# Patient Record
Sex: MALE | Race: WHITE | Employment: FULL TIME | URBAN - METROPOLITAN AREA
[De-identification: names, ages, dates, MRNs, and addresses within clinical notes are randomized per-mention and may not be internally consistent; named-entity substitution may affect disease eponyms.]

---

## 2017-05-05 ENCOUNTER — EMPLOYEE WELLNESS (OUTPATIENT)
Dept: OTHER | Age: 47
End: 2017-05-05

## 2017-05-05 LAB
CHOLESTEROL, TOTAL: 188 MG/DL (ref 0–199)
GLUCOSE BLD-MCNC: 88 MG/DL (ref 74–109)
HDLC SERPL-MCNC: 51 MG/DL (ref 40–59)
LDL CHOLESTEROL CALCULATED: 126 MG/DL (ref 0–129)
TRIGL SERPL-MCNC: 54 MG/DL (ref 0–200)

## 2018-03-20 VITALS — BODY MASS INDEX: 28.5 KG/M2 | WEIGHT: 193 LBS

## 2018-06-22 LAB
CHOLESTEROL, TOTAL: 203 MG/DL (ref 0–199)
GLUCOSE BLD-MCNC: 99 MG/DL (ref 74–109)
HDLC SERPL-MCNC: 52 MG/DL (ref 40–59)
LDL CHOLESTEROL CALCULATED: 130 MG/DL (ref 0–129)
TRIGL SERPL-MCNC: 103 MG/DL (ref 0–200)

## 2021-01-29 ENCOUNTER — NURSE TRIAGE (OUTPATIENT)
Dept: OTHER | Facility: CLINIC | Age: 51
End: 2021-01-29

## 2021-01-29 ENCOUNTER — VIRTUAL VISIT (OUTPATIENT)
Dept: FAMILY MEDICINE CLINIC | Age: 51
End: 2021-01-29
Payer: COMMERCIAL

## 2021-01-29 DIAGNOSIS — Z20.822 ENCOUNTER BY TELEHEALTH FOR SUSPECTED COVID-19: Primary | ICD-10-CM

## 2021-01-29 DIAGNOSIS — Z20.822 ENCOUNTER BY TELEHEALTH FOR SUSPECTED COVID-19: ICD-10-CM

## 2021-01-29 DIAGNOSIS — R43.0 LOSS OF SMELL: ICD-10-CM

## 2021-01-29 PROCEDURE — 99442 PR PHYS/QHP TELEPHONE EVALUATION 11-20 MIN: CPT | Performed by: NURSE PRACTITIONER

## 2021-01-29 ASSESSMENT — ENCOUNTER SYMPTOMS
NAUSEA: 0
DIARRHEA: 0
SORE THROAT: 0
COUGH: 1
RHINORRHEA: 0
SHORTNESS OF BREATH: 0
VOMITING: 0

## 2021-01-29 NOTE — TELEPHONE ENCOUNTER
[facility]  with red flag complaint. Brief description of triage: loss of smell and taste, cough, joint pain. Triage indicates for patient to Discuss with pcp and call back by nurse today     Care advice provided, patient verbalizes understanding; denies any other questions or concerns; instructed to call back for any new or worsening symptoms. Writer provided warm transfer to Laurie Christianson at [facility] for appointment scheduling. Attention Provider: Thank you for allowing me to participate in the care of your patient. The patient was connected to triage in response to information provided to the [facility]. Please do not respond through this encounter as the response is not directed to a shared pool. Reason for Disposition   [1] COVID-19 infection suspected by caller or triager AND [2] mild symptoms (cough, fever, or others) AND [1] no complications or SOB    Answer Assessment - Initial Assessment Questions  1. COVID-19 DIAGNOSIS: \"Who made your Coronavirus (COVID-19) diagnosis? \" \"Was it confirmed by a positive lab test?\" If not diagnosed by a HCP, ask \"Are there lots of cases (community spread) where you live? \" (See public health department website, if unsure)      Denies     2. COVID-19 EXPOSURE: \"Was there any known exposure to COVID before the symptoms began? \" CDC Definition of close contact: within 6 feet (2 meters) for a total of 15 minutes or more over a 24-hour period. Denies     3. ONSET: \"When did the COVID-19 symptoms start?\"       01/29/2021    4. WORST SYMPTOM: \"What is your worst symptom? \" (e.g., cough, fever, shortness of breath, muscle aches)      Loss of smell and taste     5. COUGH: \"Do you have a cough? \" If so, ask: \"How bad is the cough? \"        Yes, dry cough     6. FEVER: \"Do you have a fever? \" If so, ask: \"What is your temperature, how was it measured, and when did it start? \"      Denies     7.  RESPIRATORY STATUS: \"Describe your breathing? \" (e.g., shortness of breath, wheezing, unable to speak)       Denies     8. BETTER-SAME-WORSE: [place] you getting better, staying the same or getting worse compared to yesterday? \"  If getting worse, ask, \"In what way? \"      Worse, loss of smell and taste     9. HIGH RISK DISEASE: \"Do you have any chronic medical problems? \" (e.g., asthma, heart or lung disease, weak immune system, etc.)      Denies     10. PREGNANCY: \"Is there any chance you are pregnant? \" \"When was your last menstrual period? \"        N/a     11. OTHER SYMPTOMS: \"Do you have any other symptoms? \"  (e.g., chills, fatigue, headache, loss of smell or taste, muscle pain, sore throat)        See travel screen    Protocols used: CORONAVIRUS (COVID-19) DIAGNOSED OR SUSPECTED-ADULT-[place]

## 2021-01-29 NOTE — PROGRESS NOTES
level: Not on file   Occupational History    Not on file   Social Needs    Financial resource strain: Not on file    Food insecurity     Worry: Not on file     Inability: Not on file    Transportation needs     Medical: Not on file     Non-medical: Not on file   Tobacco Use    Smoking status: Never Smoker    Smokeless tobacco: Never Used   Substance and Sexual Activity    Alcohol use: Yes     Alcohol/week: 7.0 standard drinks     Types: 7 Shots of liquor per week     Comment: kee law - 1 shot every night for 10 years    Drug use: Yes     Types: Marijuana     Comment: once a day     Sexual activity: Yes     Comment: wife   Lifestyle    Physical activity     Days per week: Not on file     Minutes per session: Not on file    Stress: Not on file   Relationships    Social connections     Talks on phone: Not on file     Gets together: Not on file     Attends Restorationist service: Not on file     Active member of club or organization: Not on file     Attends meetings of clubs or organizations: Not on file     Relationship status: Not on file    Intimate partner violence     Fear of current or ex partner: Not on file     Emotionally abused: Not on file     Physically abused: Not on file     Forced sexual activity: Not on file   Other Topics Concern    Not on file   Social History Narrative    Not on file     Family History   Problem Relation Age of Onset    Heart Disease Father     Cancer Mother      Allergies:  Patient has no known allergies. Review of Systems   Constitutional: Negative for chills, diaphoresis, fatigue and fever. HENT: Positive for congestion (nasal). Negative for rhinorrhea and sore throat. Loss of smell  Diminished taste   Respiratory: Positive for cough (dry (started months ago)). Negative for shortness of breath. Gastrointestinal: Negative for diarrhea, nausea and vomiting. Musculoskeletal: Negative for myalgias. Neurological: Negative for headaches. PHYSICAL EXAM/ RESULTS    (Limited exam: only performed what is available through a visual exam during the video encounter as indicated due to the restrictions of the COVID-19 pandemic)    There were no vitals taken for this visit. Physical Exam  HENT:      Nose: Congestion present. Pulmonary:      Effort: Pulmonary effort is normal. No respiratory distress. Neurological:      Mental Status: He is alert. Psychiatric:         Behavior: Behavior normal.         Thought Content: Thought content normal.         Judgment: Judgment normal.         No results found for this or any previous visit (from the past 2016 hour(s)). Assessment:       Diagnosis Orders   1. Encounter by telehealth for suspected COVID-19  COVID-19 Ambulatory   2. Loss of smell           Orders Placed This Encounter   Procedures    COVID-19 Ambulatory     Standing Status:   Future     Standing Expiration Date:   1/29/2022     Scheduling Instructions:      Saline media preferred given current shortage of viral transport media but both acceptable     Order Specific Question:   Is this test for diagnosis or screening? Answer:   Diagnosis of ill patient     Order Specific Question:   Symptomatic for COVID-19 as defined by CDC? Answer:   Yes     Order Specific Question:   Date of Symptom Onset     Answer:   1/28/2021     Order Specific Question:   Hospitalized for COVID-19? Answer:   No     Order Specific Question:   Admitted to ICU for COVID-19? Answer:   No     Order Specific Question:   Employed in healthcare setting? Answer:   No     Order Specific Question:   Resident in a congregate (group) care setting? Answer:   No     Order Specific Question:   Pregnant: Answer:   No     Order Specific Question:   Previously tested for COVID-19? Answer:   No         Plan:   No follow-ups on file. There are no Patient Instructions on file for this visit.     Side effects and adverse effects of any medication prescribed today, as well as treatment plan/rationale, follow-up care, and result expectations have been discussed with the patient. Expresses understanding and desires to proceed with treatment plan. Discussed signs and symptoms which require immediate follow-up in ED/call to 911. Understanding verbalized. I have reviewed and updated the electronic medical record. As always, if symptoms worsen, go directly to ED. This visit ended at 11:30    The resources used for this visit were Smart Checkout for chart access and telephone    MAURI Farmer CNP      An  electronic signature was used to authenticate this note. --MAURI Ag CNP on 1/29/2021 at 11:28 AM        Pursuant to the emergency declaration under the [address] [place] [address] authority and the [employer] Act, this Virtual  Visit was conducted, with patient's consent, to reduce the patient's risk of exposure to COVID-19 and provide continuity of care for an established patient. Services were provided through a video synchronous discussion virtually to substitute for in-person clinic visit.

## 2021-01-29 NOTE — PATIENT INSTRUCTIONS
Patient Education        10 Things to Do When You Have COVID-19    Stay home. Don't go to school, work, or public areas. And don't use public transportation, ride-shares, or taxis unless you have no choice. Leave your home only if you need to get medical care. But call the doctor's office first so they know you're coming. And wear a cloth face cover. Ask before leaving isolation. Talk with your doctor or other health professional about when it will be safe for you to leave isolation. Wear a cloth face cover when you are around other people. It can help stop the spread of the virus when you cough or sneeze. Limit contact with people in your home. If possible, stay in a separate bedroom and use a separate bathroom. Avoid contact with pets and other animals. If possible, have a friend or family member care for them while you're sick. Cover your mouth and nose with a tissue when you cough or sneeze. Then throw the tissue in the trash right away. Wash your hands often, especially after you cough or sneeze. Use soap and water, and scrub for at least 20 seconds. If soap and water aren't available, use an alcohol-based hand . Don't share personal household items. These include bedding, towels, cups and glasses, and eating utensils. Clean and disinfect your home every day. Use household  or disinfectant wipes or sprays. Take special care to clean things that you grab with your hands. These include doorknobs, remote controls, phones, and handles on your refrigerator and microwave. And don't forget countertops, tabletops, bathrooms, and computer keyboards. Take acetaminophen (Tylenol) to relieve fever and body aches. Read and follow all instructions on the label. Current as of: December 18, 2020               Content Version: 12.7  © 2006-2021 Healthwise, [employer]. Care instructions adapted under license by [employer].  If you have questions about a medical condition or this instruction, always ask your healthcare professional. [place] [address] any warranty or liability for your use of this information.

## 2021-01-31 LAB
SARS-COV-2: DETECTED
SOURCE: ABNORMAL

## 2022-10-21 ENCOUNTER — NURSE TRIAGE (OUTPATIENT)
Dept: OTHER | Facility: CLINIC | Age: 52
End: 2022-10-21

## 2022-10-21 ENCOUNTER — HOSPITAL ENCOUNTER (EMERGENCY)
Age: 52
Discharge: HOME | End: 2022-10-22
Payer: COMMERCIAL

## 2022-10-21 VITALS
DIASTOLIC BLOOD PRESSURE: 99 MMHG | OXYGEN SATURATION: 96 % | BODY MASS INDEX: 32.49 KG/M2 | HEART RATE: 86 BPM | SYSTOLIC BLOOD PRESSURE: 183 MMHG | TEMPERATURE: 98 F | WEIGHT: 220 LBS | RESPIRATION RATE: 18 BRPM

## 2022-10-21 DIAGNOSIS — S05.02XA ABRASION OF LEFT CORNEA, INITIAL ENCOUNTER: Primary | ICD-10-CM

## 2022-10-21 PROCEDURE — 99283 EMERGENCY DEPT VISIT LOW MDM: CPT

## 2022-10-21 PROCEDURE — 6370000000 HC RX 637 (ALT 250 FOR IP): Performed by: PHYSICIAN ASSISTANT

## 2022-10-21 RX ORDER — HYDROCODONE BITARTRATE AND ACETAMINOPHEN 5; 325 MG/1; MG/1
1 TABLET ORAL ONCE
Status: COMPLETED | OUTPATIENT
Start: 2022-10-21 | End: 2022-10-21

## 2022-10-21 RX ORDER — HYDROCODONE BITARTRATE AND ACETAMINOPHEN 5; 325 MG/1; MG/1
1 TABLET ORAL EVERY 6 HOURS PRN
Qty: 10 TABLET | Refills: 0 | Status: SHIPPED | OUTPATIENT
Start: 2022-10-21 | End: 2022-10-24

## 2022-10-21 RX ADMIN — TOBRAMYCIN 0.5 INCH: 3 OINTMENT OPHTHALMIC at 23:39

## 2022-10-21 RX ADMIN — FLUORESCEIN SODIUM 1 MG: 1 STRIP OPHTHALMIC at 23:00

## 2022-10-21 RX ADMIN — HYDROCODONE BITARTRATE AND ACETAMINOPHEN 1 TABLET: 5; 325 TABLET ORAL at 23:38

## 2022-10-21 ASSESSMENT — ENCOUNTER SYMPTOMS
APNEA: 0
VOICE CHANGE: 0
EYE REDNESS: 1
COUGH: 0
VOMITING: 0
EYE PAIN: 1
NAUSEA: 1
EYE DISCHARGE: 1
ABDOMINAL DISTENTION: 0
ANAL BLEEDING: 0

## 2022-10-22 NOTE — TELEPHONE ENCOUNTER
Location of patient: [place]     Subjective: Caller states \" poked himself in the eye with a tree branch\"      Current Symptoms:   +states he is able to still see - denies blurred vision   +swelling   +left eye   +vomiting x 1 episode   +headache   -denies neck pain   +chills   +pt states he is vomiting from the pain     Onset: 8 hours ago; worsening    Associated Symptoms: NA    Pain Severity: 8/10; N/A; constant    Temperature: Denies      What has been tried: N/A     Recommended disposition: Go to ED Now    Care advice provided, patient verbalizes understanding; denies any other questions or concerns; instructed to call back for any new or worsening symptoms. Patient/caller agrees to proceed to [facility] EMERGENCY [facility] AT [place]  Emergency Department    Attention Provider: Thank you for allowing me to participate in the care of your patient. The patient was connected to triage in response to symptoms provided. Please do not respond through this encounter as the response is not directed to a shared pool.     Reason for Disposition   [1] SEVERE pain AND [2] not improved 2 hours after pain medicine/ice packs    Protocols used: Eye Injury-ADULT-

## 2022-10-22 NOTE — DISCHARGE INSTRUCTIONS
Follow up with opthamology and occupational medicine. Return to er if any symptoms worsen or new symptoms develop. do not drive or operate equipment while taking pain medications as norco can cause drowsiness

## 2022-10-22 NOTE — ED NOTES
Vision equal bilaterally without correction using snellen eye chart     Gwen Baldwin RN  10/21/22 [id number]

## 2022-10-22 NOTE — ED PROVIDER NOTES
[address] [facility] ED  eMERGENCY dEPARTMENT eNCOUnter      Pt Name: Yun Camarillo  MRN: [id number]  [place] 1970  Date of evaluation: 10/21/2022  Provider: Robert Harrell Dr       Chief Complaint   Patient presents with    Eye Injury     Poked left eye with tree branch         HISTORY OF PRESENT ILLNESS   (Location/Symptom, Timing/Onset,Context/Setting, Quality, Duration, Modifying Factors, Severity)  Note limiting factors. Yun Camarillo is a 47 y.o. male who presents to the emergency department climbing down from an excavator and branch hit him in the left eye he denies any loss of vision but notes pain especially when opening eye happened approximately 230 today denies any fever chills vomiting he did have some nauseousness secondary to pain earlier which has resolved. HPI    NursingNotes were reviewed. REVIEW OF SYSTEMS    (2-9 systems for level 4, 10 or more for level 5)     Review of Systems   Constitutional:  Negative for activity change, appetite change and unexpected weight change. HENT:  Negative for ear discharge, nosebleeds and voice change. Eyes:  Positive for pain, discharge and redness. Negative for visual disturbance. Respiratory:  Negative for apnea and cough. Cardiovascular:  Negative for chest pain. Gastrointestinal:  Positive for nausea. Negative for abdominal distention, anal bleeding and vomiting. Genitourinary:  Negative for dysuria and hematuria. Musculoskeletal:  Negative for joint swelling. Skin:  Negative for pallor. Neurological:  Negative for seizures and facial asymmetry. Hematological:  Does not bruise/bleed easily. Psychiatric/Behavioral:  Negative for behavioral problems, self-injury and sleep disturbance. All other systems reviewed and are negative. Except as noted above the remainder of the review of systems was reviewed and negative.        PAST MEDICAL HISTORY     Past Medical History:   Diagnosis Date Kidney stones     kidney stones         SURGICALHISTORY       Past Surgical History:   Procedure Laterality Date    CYST REMOVAL  1995    on spine    CYSTOSCOPY  08/19/15    FLEX W/STENT EXTRACT         CURRENT MEDICATIONS       Previous Medications    MUPIROCIN (BACTROBAN) 2 % OINTMENT    Apply topically 2 times daily. TERBINAFINE (LAMISIL) 1 % CREAM    Apply topically 2 times daily. Patient has no known allergies. FAMILY HISTORY       Family History   Problem Relation Age of Onset    Heart Disease Father     Cancer Mother           SOCIAL HISTORY       Social History     Socioeconomic History    Marital status:    Tobacco Use    Smoking status: Never    Smokeless tobacco: Never   Substance and Sexual Activity    Alcohol use: Yes     Alcohol/week: 7.0 standard drinks     Types: 7 Shots of liquor per week     Comment: kee law - 1 shot every night for 10 years    Drug use: Yes     Types: Marijuana (Weed)     Comment: once a day     Sexual activity: Yes     Comment: wife       SCREENINGS   [place] Coma Scale  Eye Opening: Spontaneous  Best Verbal Response: Oriented  Best Motor Response: Obeys commands  Priti Coma Scale Score: 15  Ebola Virus Disease (EVD) Screening   Temp: 98 °F (36.7 °C)  CIWA Assessment  BP: (!) 183/99  Heart Rate: 86    PHYSICAL EXAM    (up to 7 for level 4, 8 or more for level 5)     ED Triage Vitals [10/21/22 2233]   BP Temp Temp src Heart Rate Resp SpO2 Height Weight   (!) 183/99 98 °F (36.7 °C) -- 86 18 96 % -- 220 lb (99.8 kg)       Physical Exam  Vitals and nursing note reviewed. Constitutional:       General: He is not in acute distress. Appearance: He is well-developed. HENT:      Head: Normocephalic and atraumatic. Right Ear: External ear normal.      Left Ear: External ear normal.      Nose: Nose normal.      Mouth/Throat:      Mouth: Mucous membranes are moist.   Eyes:      General:         Right eye: No discharge.          Left eye: Discharge present. Pupils: Pupils are equal, round, and reactive to light. Comments: Ophthalmoscope negative steamy cornea negative visible foreign body patient retains normal extraocular motions. Tetracaine 2 drops instilled fluorescein stain dye uptake noted at 2 o'clock position overlying iris. Negative foreign body lids everted lids swept negative foreign body. Cardiovascular:      Rate and Rhythm: Normal rate and regular rhythm. Pulses: Normal pulses. Heart sounds: Normal heart sounds. Pulmonary:      Effort: Pulmonary effort is normal. No respiratory distress. Breath sounds: Normal breath sounds. No stridor. Abdominal:      General: Bowel sounds are normal. There is no distension. Palpations: Abdomen is soft. Musculoskeletal:         General: Normal range of motion. Cervical back: Normal range of motion and neck supple. Skin:     General: Skin is warm. Findings: No erythema. Neurological:      Mental Status: He is alert and oriented to person, place, and time. Psychiatric:         Mood and Affect: Mood normal.       RESULTS     EKG: All EKG's are interpreted by the Emergency Department Physician who either signs or Co-signsthis chart in the absence of a cardiologist.         RADIOLOGY:   Tai Marten such as CT, Ultrasound and MRI are read by the radiologist. Plain radiographic images are visualized and preliminarily interpreted by the emergency physician with the below findings:         Interpretation per the Radiologist below, if available at the time ofthis note:    No orders to display         ED BEDSIDE ULTRASOUND:   Performed by ED Physician - none    LABS:  Labs Reviewed - No data to display    All other labs were within normal range or not returned as of this dictation.     EMERGENCY DEPARTMENT COURSE and DIFFERENTIAL DIAGNOSIS/MDM:   Vitals:    Vitals:    10/21/22 2233   BP: (!) 183/99   Pulse: 86   Resp: 18   Temp: 98 °F (36.7 °C)   SpO2: 96% Weight: 220 lb (99.8 kg)            MDM  Number of Diagnoses or Management Options  Abrasion of left cornea, initial encounter  Diagnosis management comments: Patient to follow-up with ophthalmology and occupational medicine is not to drive or operate commercial equipment with eye injury or taking pain medications. Return to if any symptoms worsen or new symptoms well. We discussed ibuprofen Norco and tobramycin 3 times a day. CRITICAL CARE TIME   Total Critical Care time was   minutes, excluding separately reportableprocedures. There was a high probability of clinicallysignificant/life threatening deterioration in the patient's condition which required my urgent intervention. CONSULTS:  None    PROCEDURES:  Unless otherwise noted below, none     Procedures    FINAL IMPRESSION      1. Abrasion of left cornea, initial encounter          DISPOSITION/PLAN   DISPOSITION Decision To Discharge 10/21/2022 11:28:23 PM      PATIENT REFERRED TO:  [address] [place] [address] Rd  [phone]  Call in 1 day      [facility] opthamology    Call in 1 day      [facility]) ED  [address] [address]  [phone]  Go to   If symptoms worsen    Occupational medicine    Go in 2 days      DISCHARGE MEDICATIONS:  New Prescriptions    HYDROCODONE-ACETAMINOPHEN (NORCO) 5-325 MG PER TABLET    Take 1 tablet by mouth every 6 hours as needed for Pain for up to 3 days. Intended supply: 3 days.  Take lowest dose possible to manage pain          (Please note that portions of this note were completed with a voice recognition program.  Efforts were made to edit the dictations but occasionally words are mis-transcribed.)    Magdalene Joe PA-C (electronically signed)  Attending Emergency Physician        Magdalene Joe PA-C  10/21/22 6055

## 2024-10-08 ENCOUNTER — HOSPITAL ENCOUNTER (INPATIENT)
Age: 54
LOS: 1 days | Discharge: HOME | DRG: 881 | End: 2024-10-10
Attending: STUDENT IN AN ORGANIZED HEALTH CARE EDUCATION/TRAINING PROGRAM | Admitting: PSYCHIATRY & NEUROLOGY
Payer: COMMERCIAL

## 2024-10-08 DIAGNOSIS — F32.A DEPRESSION, UNSPECIFIED DEPRESSION TYPE: Primary | ICD-10-CM

## 2024-10-08 LAB
ALBUMIN SERPL-MCNC: 4.6 G/DL (ref 3.5–4.6)
ALP SERPL-CCNC: 48 U/L (ref 35–104)
ALT SERPL-CCNC: 28 U/L (ref 0–41)
AMPHET UR QL SCN: ABNORMAL
ANION GAP SERPL CALCULATED.3IONS-SCNC: 11 MEQ/L (ref 9–15)
APAP SERPL-MCNC: <5 UG/ML (ref 10–30)
AST SERPL-CCNC: 20 U/L (ref 0–40)
BARBITURATES UR QL SCN: ABNORMAL
BASOPHILS # BLD: 0 K/UL (ref 0–0.2)
BASOPHILS NFR BLD: 0.4 %
BENZODIAZ UR QL SCN: ABNORMAL
BILIRUB SERPL-MCNC: 0.3 MG/DL (ref 0.2–0.7)
BILIRUB UR QL STRIP: NEGATIVE
BUN SERPL-MCNC: 15 MG/DL (ref 6–20)
CALCIUM SERPL-MCNC: 9.5 MG/DL (ref 8.5–9.9)
CANNABINOIDS UR QL SCN: POSITIVE
CHLORIDE SERPL-SCNC: 102 MEQ/L (ref 95–107)
CHOLEST SERPL-MCNC: 173 MG/DL (ref 0–199)
CK SERPL-CCNC: 196 U/L (ref 0–190)
CLARITY UR: CLEAR
CO2 SERPL-SCNC: 24 MEQ/L (ref 20–31)
COCAINE UR QL SCN: ABNORMAL
COLOR UR: YELLOW
CREAT SERPL-MCNC: 0.78 MG/DL (ref 0.7–1.2)
DRUG SCREEN COMMENT UR-IMP: ABNORMAL
EOSINOPHIL # BLD: 0.1 K/UL (ref 0–0.7)
EOSINOPHIL NFR BLD: 1.3 %
ERYTHROCYTE [DISTWIDTH] IN BLOOD BY AUTOMATED COUNT: 12.9 % (ref 11.5–14.5)
ETHANOL PERCENT: NORMAL G/DL
ETHANOLAMINE SERPL-MCNC: <10 MG/DL (ref 0–0.08)
FENTANYL SCREEN, URINE: ABNORMAL
GLOBULIN SER CALC-MCNC: 2.3 G/DL (ref 2.3–3.5)
GLUCOSE SERPL-MCNC: 128 MG/DL (ref 70–99)
GLUCOSE UR STRIP-MCNC: NEGATIVE MG/DL
HCG UR QL: NEGATIVE
HCT VFR BLD AUTO: 45.1 % (ref 42–52)
HDLC SERPL-MCNC: 53 MG/DL (ref 40–59)
HGB BLD-MCNC: 15.5 G/DL (ref 14–18)
HGB UR QL STRIP: NEGATIVE
KETONES UR STRIP-MCNC: ABNORMAL MG/DL
LDLC SERPL CALC-MCNC: 99 MG/DL (ref 0–129)
LEUKOCYTE ESTERASE UR QL STRIP: NEGATIVE
LYMPHOCYTES # BLD: 1.5 K/UL (ref 1–4.8)
LYMPHOCYTES NFR BLD: 18.7 %
MCH RBC QN AUTO: 29.5 PG (ref 27–31.3)
MCHC RBC AUTO-ENTMCNC: 34.4 % (ref 33–37)
MCV RBC AUTO: 85.9 FL (ref 79–92.2)
METHADONE UR QL SCN: ABNORMAL
MONOCYTES # BLD: 0.7 K/UL (ref 0.2–0.8)
MONOCYTES NFR BLD: 8.8 %
NEUTROPHILS # BLD: 5.5 K/UL (ref 1.4–6.5)
NEUTS SEG NFR BLD: 70.5 %
NITRITE UR QL STRIP: NEGATIVE
OPIATES UR QL SCN: ABNORMAL
OXYCODONE UR QL SCN: ABNORMAL
PCP UR QL SCN: ABNORMAL
PH UR STRIP: 6 [PH] (ref 5–9)
PLATELET # BLD AUTO: 177 K/UL (ref 130–400)
POTASSIUM SERPL-SCNC: 3.5 MEQ/L (ref 3.4–4.9)
PROPOXYPH UR QL SCN: ABNORMAL
PROT SERPL-MCNC: 6.9 G/DL (ref 6.3–8)
PROT UR STRIP-MCNC: NEGATIVE MG/DL
RBC # BLD AUTO: 5.25 M/UL (ref 4.7–6.1)
SALICYLATES SERPL-MCNC: <0.3 MG/DL (ref 15–30)
SODIUM SERPL-SCNC: 137 MEQ/L (ref 135–144)
SP GR UR STRIP: 1.02 (ref 1–1.03)
TRIGL SERPL-MCNC: 103 MG/DL (ref 0–150)
TSH SERPL-MCNC: 2.79 UIU/ML (ref 0.44–3.86)
URINE REFLEX TO CULTURE: ABNORMAL
UROBILINOGEN UR STRIP-ACNC: 1 E.U./DL
WBC # BLD AUTO: 7.7 K/UL (ref 4.8–10.8)

## 2024-10-08 PROCEDURE — 99285 EMERGENCY DEPT VISIT HI MDM: CPT

## 2024-10-08 PROCEDURE — 36415 COLL VENOUS BLD VENIPUNCTURE: CPT

## 2024-10-08 PROCEDURE — 85025 COMPLETE CBC W/AUTO DIFF WBC: CPT

## 2024-10-08 PROCEDURE — 80053 COMPREHEN METABOLIC PANEL: CPT

## 2024-10-08 PROCEDURE — 83036 HEMOGLOBIN GLYCOSYLATED A1C: CPT

## 2024-10-08 PROCEDURE — 82077 ASSAY SPEC XCP UR&BREATH IA: CPT

## 2024-10-08 PROCEDURE — 84703 CHORIONIC GONADOTROPIN ASSAY: CPT

## 2024-10-08 PROCEDURE — 80143 DRUG ASSAY ACETAMINOPHEN: CPT

## 2024-10-08 PROCEDURE — 82550 ASSAY OF CK (CPK): CPT

## 2024-10-08 PROCEDURE — 84443 ASSAY THYROID STIM HORMONE: CPT

## 2024-10-08 PROCEDURE — 80307 DRUG TEST PRSMV CHEM ANLYZR: CPT

## 2024-10-08 PROCEDURE — 80061 LIPID PANEL: CPT

## 2024-10-08 PROCEDURE — 80179 DRUG ASSAY SALICYLATE: CPT

## 2024-10-08 PROCEDURE — 81003 URINALYSIS AUTO W/O SCOPE: CPT

## 2024-10-08 ASSESSMENT — LIFESTYLE VARIABLES
HOW OFTEN DO YOU HAVE A DRINK CONTAINING ALCOHOL: NEVER
HOW MANY STANDARD DRINKS CONTAINING ALCOHOL DO YOU HAVE ON A TYPICAL DAY: PATIENT DOES NOT DRINK

## 2024-10-09 PROBLEM — F32.A DEPRESSION, UNSPECIFIED: Status: ACTIVE | Noted: 2024-10-09

## 2024-10-09 LAB
EKG ATRIAL RATE: 68 BPM
EKG P AXIS: 43 DEGREES
EKG P-R INTERVAL: 134 MS
EKG Q-T INTERVAL: 416 MS
EKG QRS DURATION: 100 MS
EKG QTC CALCULATION (BAZETT): 442 MS
EKG R AXIS: 6 DEGREES
EKG T AXIS: 24 DEGREES
EKG VENTRICULAR RATE: 68 BPM
ESTIMATED AVERAGE GLUCOSE: 111 MG/DL
HBA1C MFR BLD: 5.5 % (ref 4–6)

## 2024-10-09 PROCEDURE — 1240000000 HC EMOTIONAL WELLNESS R&B

## 2024-10-09 PROCEDURE — 6370000000 HC RX 637 (ALT 250 FOR IP): Performed by: PSYCHIATRY & NEUROLOGY

## 2024-10-09 PROCEDURE — 99223 1ST HOSP IP/OBS HIGH 75: CPT | Performed by: PSYCHIATRY & NEUROLOGY

## 2024-10-09 PROCEDURE — 93010 ELECTROCARDIOGRAM REPORT: CPT | Performed by: INTERNAL MEDICINE

## 2024-10-09 PROCEDURE — 93005 ELECTROCARDIOGRAM TRACING: CPT | Performed by: STUDENT IN AN ORGANIZED HEALTH CARE EDUCATION/TRAINING PROGRAM

## 2024-10-09 RX ORDER — ACETAMINOPHEN 325 MG/1
650 TABLET ORAL EVERY 4 HOURS PRN
Status: DISCONTINUED | OUTPATIENT
Start: 2024-10-09 | End: 2024-10-10 | Stop reason: HOSPADM

## 2024-10-09 RX ORDER — BENZTROPINE MESYLATE 1 MG/ML
2 INJECTION, SOLUTION INTRAMUSCULAR; INTRAVENOUS 2 TIMES DAILY PRN
Status: DISCONTINUED | OUTPATIENT
Start: 2024-10-09 | End: 2024-10-10 | Stop reason: HOSPADM

## 2024-10-09 RX ORDER — TRAZODONE HYDROCHLORIDE 50 MG/1
50 TABLET, FILM COATED ORAL NIGHTLY PRN
Status: DISCONTINUED | OUTPATIENT
Start: 2024-10-09 | End: 2024-10-10 | Stop reason: HOSPADM

## 2024-10-09 RX ORDER — HYDROXYZINE PAMOATE 50 MG/1
50 CAPSULE ORAL EVERY 6 HOURS PRN
Status: DISCONTINUED | OUTPATIENT
Start: 2024-10-09 | End: 2024-10-10 | Stop reason: HOSPADM

## 2024-10-09 RX ORDER — SERTRALINE HYDROCHLORIDE 25 MG/1
25 TABLET, FILM COATED ORAL DAILY
Status: DISCONTINUED | OUTPATIENT
Start: 2024-10-09 | End: 2024-10-10 | Stop reason: HOSPADM

## 2024-10-09 RX ORDER — HYDROXYZINE HYDROCHLORIDE 50 MG/ML
50 INJECTION, SOLUTION INTRAMUSCULAR EVERY 6 HOURS PRN
Status: DISCONTINUED | OUTPATIENT
Start: 2024-10-09 | End: 2024-10-10 | Stop reason: HOSPADM

## 2024-10-09 RX ORDER — HALOPERIDOL 5 MG/1
5 TABLET ORAL EVERY 6 HOURS PRN
Status: DISCONTINUED | OUTPATIENT
Start: 2024-10-09 | End: 2024-10-10 | Stop reason: HOSPADM

## 2024-10-09 RX ORDER — MAGNESIUM HYDROXIDE/ALUMINUM HYDROXICE/SIMETHICONE 120; 1200; 1200 MG/30ML; MG/30ML; MG/30ML
30 SUSPENSION ORAL PRN
Status: DISCONTINUED | OUTPATIENT
Start: 2024-10-09 | End: 2024-10-10 | Stop reason: HOSPADM

## 2024-10-09 RX ORDER — HALOPERIDOL 5 MG/ML
5 INJECTION INTRAMUSCULAR EVERY 6 HOURS PRN
Status: DISCONTINUED | OUTPATIENT
Start: 2024-10-09 | End: 2024-10-10 | Stop reason: HOSPADM

## 2024-10-09 RX ADMIN — TRAZODONE HYDROCHLORIDE 50 MG: 50 TABLET ORAL at 21:16

## 2024-10-09 RX ADMIN — SERTRALINE HYDROCHLORIDE 25 MG: 25 TABLET ORAL at 11:29

## 2024-10-09 ASSESSMENT — SLEEP AND FATIGUE QUESTIONNAIRES
DO YOU HAVE DIFFICULTY SLEEPING: YES
AVERAGE NUMBER OF SLEEP HOURS: 6
SLEEP PATTERN: DIFFICULTY FALLING ASLEEP
DO YOU USE A SLEEP AID: NO

## 2024-10-09 ASSESSMENT — PATIENT HEALTH QUESTIONNAIRE - PHQ9
5. POOR APPETITE OR OVEREATING: MORE THAN HALF THE DAYS
1. LITTLE INTEREST OR PLEASURE IN DOING THINGS: MORE THAN HALF THE DAYS
2. FEELING DOWN, DEPRESSED OR HOPELESS: SEVERAL DAYS
SUM OF ALL RESPONSES TO PHQ QUESTIONS 1-9: 15
SUM OF ALL RESPONSES TO PHQ QUESTIONS 1-9: 15
SUM OF ALL RESPONSES TO PHQ9 QUESTIONS 1 & 2: 3
7. TROUBLE CONCENTRATING ON THINGS, SUCH AS READING THE NEWSPAPER OR WATCHING TELEVISION: MORE THAN HALF THE DAYS
SUM OF ALL RESPONSES TO PHQ QUESTIONS 1-9: 13
10. IF YOU CHECKED OFF ANY PROBLEMS, HOW DIFFICULT HAVE THESE PROBLEMS MADE IT FOR YOU TO DO YOUR WORK, TAKE CARE OF THINGS AT HOME, OR GET ALONG WITH OTHER PEOPLE: VERY DIFFICULT
3. TROUBLE FALLING OR STAYING ASLEEP: MORE THAN HALF THE DAYS
9. THOUGHTS THAT YOU WOULD BE BETTER OFF DEAD, OR OF HURTING YOURSELF: MORE THAN HALF THE DAYS
4. FEELING TIRED OR HAVING LITTLE ENERGY: SEVERAL DAYS
SUM OF ALL RESPONSES TO PHQ QUESTIONS 1-9: 15
8. MOVING OR SPEAKING SO SLOWLY THAT OTHER PEOPLE COULD HAVE NOTICED. OR THE OPPOSITE, BEING SO FIGETY OR RESTLESS THAT YOU HAVE BEEN MOVING AROUND A LOT MORE THAN USUAL: SEVERAL DAYS
6. FEELING BAD ABOUT YOURSELF - OR THAT YOU ARE A FAILURE OR HAVE LET YOURSELF OR YOUR FAMILY DOWN: MORE THAN HALF THE DAYS

## 2024-10-09 ASSESSMENT — LIFESTYLE VARIABLES
HOW OFTEN DO YOU HAVE A DRINK CONTAINING ALCOHOL: MONTHLY OR LESS
HOW MANY STANDARD DRINKS CONTAINING ALCOHOL DO YOU HAVE ON A TYPICAL DAY: 1 OR 2

## 2024-10-09 NOTE — GROUP NOTE
Group Therapy Note    Date: 10/9/2024    Group Start Time: 1700  Group End Time: 1750  Group Topic: Recreational    [facility] 3W Simeon Juarez      Patients gathered in the day room and played a friendly game of corn hole as today's recreational group.   Group Therapy Note    Attendees: 7/17    Notes:  Pt did not attend.     Discipline Responsible: Behavorial Health Tech      Signature:  Simeon Morales

## 2024-10-09 NOTE — ED NOTES
Patient stated \"Do you know what time I will be able to leave? I have to work in the morning and the  told me as soon as I got checked out, I could go home.\" MITA RN explained to patient that a work-up would be done, including a mental health assessment, and the psychiatrist would be contacted before a decision would be made about disposition. Patient verbalized understanding.

## 2024-10-09 NOTE — ED NOTES
Per request of Dr Ferguson, patient's adult son was contacted for additional collateral.     Son's name is Heriberto, age 23, phone # [phone].     Son states the following: \"They [patient and his wife] sat us down tonight to tell us that patient was going to get admitted to the hospital. 30-45 minutes after that, my parents were arguing and I heard my dad say \"I'm leaving.\" Son states \"I told him you can't leave and then I grabbed him up in a bear hug.\" Son reports he held on tightly to his father and his father told him \"If she calls the , I will kill myself. I will do  it.\" Patient 's son reports he tried to rationalize with his father saying \"what about me [the son] and my sisters, what about us?\" to which the patient responded \"this isn't about you, I need to do it for me. I'm going to do it, it's for me.\" Son reports that patient said this several times.    Son reports that patient has a gun, a shotgun, and a cross bow. Son reports none of the items are in a gun safe, just in an open case. Son is concerned about patient's safety.

## 2024-10-09 NOTE — ED PROVIDER NOTES
[facility]  Emergency Department Encounter  Emergency Medicine      Pt Name: Dinesh Shen  MRN:[id number]  Birthdate 1970  Date of evaluation: 10/9/24  PCP:  Marycruz Crespo PA-C    CHIEF COMPLAINT       Chief Complaint   Patient presents with    Psychiatric Evaluation       HISTORY OF PRESENT ILLNESS  (Location/Symptom, Timing/Onset, Context/Setting, Quality, Duration, ModifyingFactors, Severity.)      Dinesh Shen is a 54 y.o. male presents with depression.  Patient states that he admits to feeling depressed as he recently found out his wife has been talking to other men.  He found this out 3 weeks ago, he said it was devastating as that he thought they had a really good marriage.  He said that they have been trying to work it out over the last 3 weeks and his wife originally told him that she had stopped talking to other men however she recently confessed tonight that she has not been truthful and still is talking with other people.  He said that at times he is stated that he wants to kill himself but he does not actually mean it.  He said he wanted to check-in to the hospital on Friday after he was done with his work shifts for the week because they live [facility].  His wife had called the police and he agreed to come with them tonight.  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  He denies alcohol use, admits to some marijuana, no other drug use.  Denies any psychiatric history never tried to hurt himself before no visual auditory hallucinations, he denies being suicidal, denies homicidal ideation  Patient said that we can check his phone and see that his wife wanted him to check in with her when he were to get home later tonight.  Ideally he would like to go home and finish his work week and then come back in the weekend to get checked in for depression, he is never had depression  before.    PAST MEDICAL / SURGICAL / SOCIAL /FAMILY HISTORY      has a past medical history of Depression, unspecified and Kidney stones.  No other pertinent PMH on review with patient/guardian.     has a past surgical history that includes cyst removal (1995) and Cystocopy (08/19/15).  No other pertinent PSH on review with patient/guardian.  Social History     Socioeconomic History    Marital status:      Spouse name: Erica    Number of children: 3    Years of education: 16    Highest education level: Not on file   Occupational History    Not on file   Tobacco Use    Smoking status: Never     Passive exposure: Never    Smokeless tobacco: Never   Vaping Use    Vaping status: Never Used   Substance and Sexual Activity    Alcohol use: Yes     Alcohol/week: 7.0 standard drinks of alcohol     Types: 7 Shots of liquor per week     Comment: kee law - 1 shot every night for 10 years    Drug use: Yes     Types: Marijuana (Weed)     Comment: once a day     Sexual activity: Yes     Partners: Female     Comment: wife   Other Topics Concern    Not on file   Social History Narrative    Not on file     Social Determinants of Health     Financial Resource Strain: Not on file   Food Insecurity: No Food Insecurity (10/9/2024)    Hunger Vital Sign     Worried About Running Out of Food in the Last Year: Never true     Ran Out of Food in the Last Year: Never true   Transportation Needs: No Transportation Needs (10/9/2024)    PRAPARE - Transportation     Lack of Transportation (Medical): No     Lack of Transportation (Non-Medical): No   Physical Activity: Not on file   Stress: Not on file   Social Connections: Somewhat Isolated (10/9/2024)    Social Connections ([facility] HRSN)     If for any reason you need help with day-to-day activities such as bathing, preparing meals, shopping, managing finances, etc., do you get the help you need?: Not on file   Intimate Partner Violence: Not on file   Housing Stability: Low Risk  (10/9/2024)

## 2024-10-09 NOTE — CONSULTS
Consult Note            Date:10/9/2024        Patient Name:Dinesh Shen     YOB: 1970     Age:54 y.o.    Reason for Consult: Evaluation recommendations of chronic disease management    Chief Complaint     Chief Complaint   Patient presents with    Psychiatric Evaluation          History Obtained From   patient, electronic medical record    History of Present Illness   Patient is a 54-year-old male brought in from [facility] [place] for psychiatric evaluation for thoughts of suicide ideation.  Patient has a past medical history of kidney stones.  Patient medically cleared.  Hospitalist consulted for evaluation and recommendations for acute and chronic disease management while receiving inpatient psych services.    Patient denies chest pain, palpitations, lightheadedness, headache, dizziness, shortness of breath, cough, N/V/D, and changes in appetite.  Patient also denies smoking, illicit drug, and alcohol use.  Denies self-inflicted injuries or wounds.  Labs reviewed with no significant findings.    Past Medical History     Past Medical History:   Diagnosis Date    Depression, unspecified 10/9/2024    Kidney stones     kidney stones        Past Surgical History     Past Surgical History:   Procedure Laterality Date    CYST REMOVAL  1995    on spine    CYSTOSCOPY  08/19/15    FLEX W/STENT EXTRACT        Medications     Prior to Admission medications    Medication Sig Start Date End Date Taking? Authorizing Provider   terbinafine (LAMISIL) 1 % cream Apply topically 2 times daily.  Patient not taking: Reported on 1/29/2021 7/9/17   Roger Reyes APRN - CNP   mupirocin (BACTROBAN) 2 % ointment Apply topically 2 times daily.  Patient not taking: Reported on 1/29/2021 7/9/17   Roger Reyes APRN - CNP        acetaminophen (TYLENOL) tablet 650 mg, Q4H PRN  magnesium hydroxide (MILK OF MAGNESIA) 400 MG/5ML suspension 30 mL, Daily PRN  aluminum & magnesium  specialists.  Patient will need to follow-up with PCP for chronic disease management.     I have spent greater than 70% of time  spent focused exclusively on this patient ,reviewing  chart, labs/diagnostics, reconciling medications, &  answering questions with patient and discussing plan.    Electronically signed by MAURI David CNP on 10/9/24 at 10:40 AM EDT

## 2024-10-09 NOTE — ED NOTES
Per request of Dr. Ferguson, patient's wife Glenny ([phone]) was contacted for collateral. Wife states that she and patient have been having marital problems and the patient told her that he was thinking of killing himself tonight after they had a discussion where she told him she had been talking to other men. Wife states patient has no history of self-harm and no prior suicide attempts. Wife states \"he will try to tell you he is just depressed, but it's more than that.\"

## 2024-10-09 NOTE — GROUP NOTE
Group Therapy Note    Date: 10/9/2024    Group Start Time: 1010  Group End Time: 1050  Group Topic: Art Therapy     [facility] Beverly Yan, MUNAW        Group Therapy Note    Attendees: 9       Patient's Goal:  To participate in morning group art therapy.    Notes:  Patient did not attend.     Modes of Intervention: Activity      Discipline Responsible: Psychoeducational Specialist      Signature:  Beverly Marshall MA ATR LPAT

## 2024-10-09 NOTE — ED NOTES
Provisional Diagnosis:   Depression    Psychosocial and Contextual Factors:       Patient was born in [place] and grew up in [place]. He moved to [place] in high school and graduated from [employer]. He currently lives in [place] with his wife of 36 years and two of their children, ages 16 and 23. A third grown child lives outside of the home. Patient works for [employer] in [place] and [employer] in [place].    C-SSRS Summary:      C-SSRS Suicide Screening1) Within the past month, have you wished you were dead or wished you could go to sleep and not wake up? : Yes (Patient states \"I thought about it tonight, but that's all, just a brief thought.\")2) Have you actually had any thoughts of killing yourself? : No6) Have you ever done anything, started to do anything, or prepared to do anything to end your life?: No    Risk of Suicide: Low Risk    C-SSRS Risk AssessmentSuicidal and Self-Injurious Behavior : Denies suicidal and self-injurious behaviorSuicidal Ideation (Most Severe in Past Month): Denies suicidal ideationActivating Events (Recent): Recent loss(es) or other significant negative event(s) (legal, financial, relationship, etc.)Treatment History:  (No psychiatric history.)Protective Factors (Recent): Identifies reasons for living; Responsibility to family or others/living with family; Supportive social network or familyOther Protective Factors: Children, grandchildren, wife.    Patient: Patient is well groomed, makes consistent eye contact, answers all questions thoroughly and is engaged throughout assessment.    Family: Collateral obtained from patient's wife and son (see associated notes).    Agency: None - no mental health history.    Substance Abuse: Admits to smoking marijuana, denies any other drugs or alcohol. Tox screen positive for THC only. ETOH level <10.    Present Suicidal Behavior:  None    Verbal: Denies. Reports he had a brief thought about it tonight,

## 2024-10-09 NOTE — PROGRESS NOTES
Patient is 54 year old male admitted to [facility] after presenting  to [facility] ED for mental health evaluation reporting suicidal ideation without plan.Patient states that his wife is having ongoing affair with another man and this has caused him to have suicidal thoughts. Patient currently denies suicidal/homicidal ideation. Patient cooperative with admission process and verbally contracts for safety on the unit. Consents signed. Patient given brief orientation to unit and assigned room #366.     98.7

## 2024-10-09 NOTE — CARE COORDINATION
10/9/2024 @ 0839 - Patient was approached regarding the completion of the Leisure Assessment. He presented as cooperative and engaging. Very friendly and honest. When asked about his preferred leisure interests, patient stated he likes working out, hunting, and fishing. Socially, patient spends most of his time alone or with his wife. He finds his wife to be a healthy support and his Taoism family as well. Patient stated he is more than willing to work with the doctor and find an answer to his current stressors. Patient was less detailed about his situation and was wondering when he could speak to the doctor about it. Patient stated his strengths are his strong work ethic and his love for his family. Patient brought himself to the ER with the hope of getting the help he needs.    Documentation completed by: Beverly Marshall MA ATR LPAT

## 2024-10-09 NOTE — ED NOTES
Patient to  accompanied by MITA RN and [facility] Protective Services. All of patient's belongings, paperwork, and signed pink slip were given to  staff. Patient became tearful when he found out he would not be able to text his wife in the morning because it is something he does every day. This RN explained that patient would be able to use the phone on the unit, but would not be able to use his cell phone while admitted. Patient remained tearful after arrival to .

## 2024-10-09 NOTE — CONSULTS
Spiritual Health History and Assessment/Progress Note  [facility]    Behavioral Health, Spiritual/Emotional Needs,  ,  , Initial Encounter    Name: Dinesh Shen MRN: [id number]    Age: 54 y.o.     Sex: male   Language: English   Samaritan: Adventist   Depression, unspecified     Date: 10/9/2024            Total Time Calculated: 35 min              Spiritual Assessment began in [facility]        Referral/Consult From: Patient   Encounter Overview/Reason: Behavioral Health, Spiritual/Emotional Needs  Service Provided For: Patient    Patient asked for  by name.  is part of patients nellie community.  has been supporting patient for three weeks for marital issues. Patient is depressed and sad due to what seems to be a looming divorce initiated by their wife. Patient worried about future and what that looks like. Patient unsure about returning home or seeking residence with a friend.      Nellie, Belief, Meaning:   Patient is connected with a nellie tradition or spiritual practice and has beliefs or practices that help with coping during difficult times  Family/Friends No family/friends present      Importance and Influence:  Patient has spiritual/personal beliefs that influence decisions regarding their health  Family/Friends No family/friends present    Community:  Patient feels well-supported. Support system includes: Children, Nellie Community, and Friends  Family/Friends No family/friends present    Assessment and Plan of Care:     Patient Interventions include: Facilitated expression of thoughts and feelings, Explored spiritual coping/struggle/distress, and Affirmed coping skills/support systems  Family/Friends Interventions include: No family/friends present    Patient Plan of Care: Spiritual Care available upon further referral  Family/Friends Plan of Care: No family/friends present    Electronically signed by Chaplain Neftaly Intern on 10/9/2024 at 3:55 PM

## 2024-10-09 NOTE — PROGRESS NOTES
On a scale 1 through 10, with #10 being the highest, the patient rates his depression a #4/10 and his anxiety a #1/10. The patient denies SI/HI/AVH and contracts for safety on the unit. The patient reports fair appetite, fair sleep, and taking a shower today. The patient has sad/worried affect and is soft spoken throughout assessment. The patient is seen out on the unit isolating to self. Patient does not go to groups despite encouragement from staff. Patient is calm and cooperative.

## 2024-10-09 NOTE — PROGRESS NOTES
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,  pt expressed living from pay check to [facility], pt became tearful talking about his wife, that he thought they were doing good, always did things together, however reports they slept in separate rooms for 15 years due to snoring, reports he has tried the over the counter things for snoring and that his wife  works at [employer] and has insurance , pt was encouraged to have his snoring checked out, pt reports feeling depressed over his situation, that he recently found out his wife is having an affair and she admitted to talking to different men, pt also reports there is no one to feed his dogs and let the chickens out, 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 Pt was encouraged to attend groups. Pt denied active suicidal thoughts but stated he as thought about not living any more.

## 2024-10-09 NOTE — PROGRESS NOTES
Behavioral Services  Medicare Certification Upon Admission    I certify that this patient's inpatient psychiatric hospital admission is medically necessary for:    [x] (1) Treatment which could reasonably be expected to improve this patient's condition,       [x] (2) Or for diagnostic study;     AND     [x](2) The inpatient psychiatric services are provided while the individual is under the care of a physician and are included in the individualized plan of care.    Estimated length of stay/service 3-5    Plan for post-hospital care Op care    Electronically signed by LUIS FERNANDO GODINEZ MD on 10/9/2024 at 10:18 AM

## 2024-10-09 NOTE — GROUP NOTE
Patient did not attend group.     Date: 10/9/2024    Group Start Time: 0915  Group End Time: 0950  Group Topic: Community Meeting    [facility]    Carnival of Animals  Music Analysis/Interpretation, Music and Mindfulness, Receptive Music Listening    Patients will be asked to listen to selections from \"Carnival of Animals\" by Camille Saint-Saens and identify what animal they think the composer intended to represent through the music based on what they hear. Patients will be challenged to stay mindful on the music while listening, and take note of how their emotions might change. Patients will discuss their interpretation of each selection and the benefits and challenges of staying mindful.    Selections: II. Hens; VII. Aquarium; V. Elephant; XII. Fossils    Goals: Improve Attention to Task, Develop/Maintain Coping Skills, Improve Mood, Improve/Maintain Self-Expression, Improve Self-Awareness, Decrease Impulsive Behaviors, Increase Relaxation, Maintain Cognitive Skills     Documentation completed by CHRISTIANA Ash

## 2024-10-09 NOTE — PROGRESS NOTES
Patient arrived to unit via wheelchair and was passively searched for contraband. No contraband found. Patient skin assessed by this writer and Sarai DASILVA. Skin intact.

## 2024-10-09 NOTE — PROGRESS NOTES
Report per Sarai DASILVA. Patient is 54 year old male that was brought to ER by [place] [employer] this evening after making a statement to his wife and son that he was considering suicide. Patient reports that he and his wife have been having problems and she had been seeing other people. Patient was under the impression that they [patient and his wife] were working on their marriage but wife told him tonight she remains in contact with the other man/men. Patient was upset, felt betrayed and lied to and admits to having a thought of suicide briefly in the midst of the discussion. Patient adamantly denies SI now, stating he has too much to live for, including his wife.Patient has no psychiatric history and takes no medications. He has no hallucinations, no delusions. He does not want to be admitted tonight, prefers to finish out his work week and come back voluntarily at the end of the week for admission. He does state he wants treatment, but does not want to miss work. Patient was born in [place] and grew up in [place]. He moved to [place] in high school and graduated from [employer]. He currently lives in [place] with his wife of 36 years and two of their children, ages 16 and 23. A third grown child lives outside of the home. Patient works for [employer] in [place] and [employer] in [place].

## 2024-10-09 NOTE — GROUP NOTE
Patient did not attend group.    Date: 10/9/2024    Group Start Time: 1210  Group End Time: 1315  Group Topic: Music Therapy    [facility]    Active Music Making, Live Music Listening, and Music Reminiscence  Patients will be given a songbook and offered the opportunity to select (a) song(s) of their choice for live music listening on [id number]. Patients will be encouraged to sing along, move along, and listen to the music.     Focus: Building Positive Experiences, Processing, and Relaxation     Goals: Improve Mood, Decrease Isolation, Increase Socialization, Improve Self-Esteem, Improve Self-Expression, Provide Reality Orientation, Develop Coping Skills     Documentation completed by CHRISTIANA Ash

## 2024-10-09 NOTE — ED NOTES
Patient was notified of plan for admission. Patient does not want to be admitted and does not understand why he will have to miss work rather than come back voluntarily on Friday. RN explained that psychiatrist feels that given the situation, it would be best for patient to be admitted tonight. Patient verbalized understanding.    Patient contacted his boss about missing work tomorrow and his son Heriberto to notify him (and the rest of patient's family) that he is being admitted tonight.

## 2024-10-09 NOTE — CARE COORDINATION
Psychosocial Assessment     Admission Reason: Depression, SI    C-SSRS Lifetime Recent Completed - Current Suicide Risk:   [] No Risk  [x] Low [] Moderate [] High     Risk Factors:     Marital issues    Protective Factors:   Stable housing  Supportive family  Supportive employment    Gender:  [x] Male [] Female [] Transgender  [] Other    Sexual Orientation:  [x] Heterosexual [] Homosexual [] Bisexual [] Other    Current or Past Mental Health and/or Addictions Treatment (and response to treatment):  [] Yes, When and Where:   [x] No    Substance Use/Alcohol Use/Addiction (document name of substance, age of onset, how much and how often, route of use and date of last use):  [x] Reports   Substance:Patient reports he does not drink. Reports 3 weeks ago when he learned of wife's affair bought a bottle of liquor and drank it     [] Denies    AUDIT: 2  DAST: 0  PHQ 9: 15    Education provided:  [] Yes  [] No  [x] N/A [] Refused    Learners: Patient []  Family []  Significant Other  [] Caregiver [] Other []   Readiness: Eager []   Acceptance  []   Nonacceptances []   Refused []   Method: Explanation []   Handout []   Response: Verbalizes Understanding []   No evidence of Learning []   Refuses []     Referral made to Let's get Real  [] Yes       Date:              [x] No    Family History of Mental Illness or Substance Use/Abuse:   [] Yes (Specify)    [x] No    Trauma and Abuse History:   [] Reports   ( Physical []  Verbal []  Emotional []  Financial []   Sexual [] )  [x] Denies      Legal History:  []  Yes (Specify)    [x] No     Involvement:  [] Yes (Specify)    [x] No     Employment and/or Benefits:    [x] Yes (Specify) [employer]    SSD []  SSI []   SNAP[] Medicaid[]  [] No      Leisure & Recreational Interests and Hobbies/Coping Skills:    Gardening, Hunting, Fishing    Ability to Complete Activities of Daily Living (ADLs):  [] Yes  [x] No (Specify) Patient reports due

## 2024-10-09 NOTE — ED NOTES
Patient changed into psych safe clothing.   Urine obtained and sent to lab.   Skin and contraband check performed.   Contraband check negative at this time.   Lab called to draw blood.

## 2024-10-09 NOTE — PLAN OF CARE
emotional support, presence and reassurance     Problem: Involuntary Admit  Goal: Will cooperate with staff recommendations and doctor's orders and will demonstrate appropriate behavior  Description: INTERVENTIONS:  1. Treat underlying conditions and offer medication as ordered  2. Educate regarding involuntary admission procedures and rules  3. Contain excessive/inappropriate behavior per unit and hospital policies  Outcome: Progressing  Flowsheets (Taken 10/9/2024 3495)  Will cooperate with staff recommendations and doctor's orders and will demonstrate appropriate behavior:   Treat underlying conditions and offer medication as ordered   Contain excessive/inappropriate behavior per unit and hospital policies

## 2024-10-09 NOTE — H&P
[facility] - Department of Psychiatry    History and Physical - Adult         CHIEF COMPLAINT:  Depression SI    History obtained from:  patient    Patient was seen after discussing with the treatment team and reviewing the chart        CIRCUMSTANCES OF ADMISSION:     54M patient brought to ER by [place] [employer] this evening after making a statement to his wife and son that he was considering suicide. Patient reports that he and his wife have been having problems and she had been seeing other people. Patient was under the impression that they [patient and his wife] were working on their marriage but wife told him tonight she remains in contact with the other man/men. Patient was upset, felt betrayed and lied to and admits to having a thought of suicide briefly in the midst of the discussion. Patient adamantly denies SI now, stating he has too much to live for, including his wife.     Patient has no psychiatric history and takes no medications. He has no hallucinations, no delusions. He does not want to be admitted tonight, prefers to finish out his work week and come back voluntarily at the end of the week for admission. He does state he wants treatment, but does not want to miss work.    HISTORY OF PRESENT ILLNESS:      The patient is a 54 y.o. male  living with wife, 16 and 24 y/o live with them, work 2 jobs with o significant past history of depression    Last night informed the children that they were   Family was planing to get himself admitted on Friday planned admit, planing to leave the house  Pt and wife was talking with his wife, found that she was talking with other men on the phone  Pt became emotional about his wife being dishonest with him. Pt wanted to leave the house the same night. Pt  informed wife that \"it was killing me\" meaning that what she was doing to him was killing him   showed up and brought him to the hospital.    Been  for 36 years. 3 weeks ago pt  ordered for anxiety  Discussed with the patient risk, benefit, alternative and common side effects for the  proposed medication treatment. Patient is consenting to the treatment.    Psychotherapy:   Encourage participation in milieu and group therapy  Individual therapy as needed      Electronically signed by LUIS FERNANDO GODINEZ MD on 10/9/2024 at 10:18 AM

## 2024-10-10 VITALS
DIASTOLIC BLOOD PRESSURE: 82 MMHG | RESPIRATION RATE: 16 BRPM | TEMPERATURE: 98.1 F | SYSTOLIC BLOOD PRESSURE: 130 MMHG | HEART RATE: 66 BPM | OXYGEN SATURATION: 98 % | HEIGHT: 69 IN | BODY MASS INDEX: 27.43 KG/M2 | WEIGHT: 185.19 LBS

## 2024-10-10 LAB
ESTIMATED AVERAGE GLUCOSE: 114 MG/DL
HBA1C MFR BLD: 5.6 % (ref 4–6)

## 2024-10-10 PROCEDURE — 83036 HEMOGLOBIN GLYCOSYLATED A1C: CPT

## 2024-10-10 PROCEDURE — 6370000000 HC RX 637 (ALT 250 FOR IP): Performed by: PSYCHIATRY & NEUROLOGY

## 2024-10-10 PROCEDURE — 99239 HOSP IP/OBS DSCHRG MGMT >30: CPT | Performed by: PSYCHIATRY & NEUROLOGY

## 2024-10-10 PROCEDURE — 36415 COLL VENOUS BLD VENIPUNCTURE: CPT

## 2024-10-10 RX ORDER — SERTRALINE HYDROCHLORIDE 25 MG/1
25 TABLET, FILM COATED ORAL DAILY
Qty: 30 TABLET | Refills: 3 | Status: SHIPPED | OUTPATIENT
Start: 2024-10-10

## 2024-10-10 RX ADMIN — SERTRALINE HYDROCHLORIDE 25 MG: 25 TABLET ORAL at 09:05

## 2024-10-10 NOTE — TRANSITION OF CARE
Behavioral Health Transition Record    Patient Name: Dinesh Shen  YOB: 1970   Medical Record Number: [id number]  Date of Admission: 10/8/2024 10:59 PM   Date of Discharge: 10/10/24    Attending Provider: Hermes Johns MD   Discharging Provider: Hermes Johns  To contact this individual call [phone] and ask the  to page.  If unavailable, ask to be transferred to Behavioral Health Provider on call.  A Behavioral Health Provider will be available on call 24/7 and during holidays.    Primary Care Provider: Marycurz Crespo PA-C    No Known Allergies    Reason for Admission: Depression    Admission Diagnosis: Depression, unspecified depression type [F32.A]  Depression, unspecified [F32.A]    * No surgery found *    Results for orders placed or performed during the hospital encounter of 10/08/24   Acetaminophen Level   Result Value Ref Range    Acetaminophen Level <5 (L) 10 - 30 ug/mL   CBC with Auto Differential   Result Value Ref Range    WBC 7.7 4.8 - 10.8 K/uL    RBC 5.25 4.70 - 6.10 M/uL    Hemoglobin 15.5 14.0 - 18.0 g/dL    Hematocrit 45.1 42.0 - 52.0 %    MCV 85.9 79.0 - 92.2 fL    MCH 29.5 27.0 - 31.3 pg    MCHC 34.4 33.0 - 37.0 %    RDW 12.9 11.5 - 14.5 %    Platelets 177 130 - 400 K/uL    Neutrophils % 70.5 %    Lymphocytes % 18.7 %    Monocytes % 8.8 %    Eosinophils % 1.3 %    Basophils % 0.4 %    Neutrophils Absolute 5.5 1.4 - 6.5 K/uL    Lymphocytes Absolute 1.5 1.0 - 4.8 K/uL    Monocytes Absolute 0.7 0.2 - 0.8 K/uL    Eosinophils Absolute 0.1 0.0 - 0.7 K/uL    Basophils Absolute 0.0 0.0 - 0.2 K/uL   CK   Result Value Ref Range    Total  (H) 0 - 190 U/L   Comprehensive Metabolic Panel   Result Value Ref Range    Sodium 137 135 - 144 mEq/L    Potassium 3.5 3.4 - 4.9 mEq/L    Chloride 102 95 - 107 mEq/L    CO2 24 20 - 31 mEq/L    Anion Gap 11 9 - 15 mEq/L    Glucose 128 (H) 70 - 99 mg/dL    BUN 15 6 - 20 mg/dL    Creatinine 0.78 0.70 - 1.20 mg/dL    Est, Glom Filt  A1C  TOMORROW AM,   R         10/09/24 1043                    To obtain results of studies pending at discharge, please contact [facility]    Follow-up Information    None          Advanced Directive:   Does the patient have an appointed surrogate decision maker? No  Does the patient have a Medical Advance Directive? No  Does the patient have a Psychiatric Advance Directive? No  If the patient does not have a surrogate or Medical Advance Directive AND Psychiatric Advance Directive, the patient was offered information on these advance directives Patient declined to complete    Patient Instructions: Please continue all medications until otherwise directed by physician.      Tobacco Cessation Discharge Plan:   Is the patient a tobacco user  and needs referral for tobacco cessation? No  Patient referred to the following for tobacco cessation with an appointment? Patient refused  Patient was offered medication to assist with tobacco cessation at discharge? No    Alcohol/Substance Abuse Discharge Plan:   Does the patient have a history of substance/alcohol abuse and requires a referral for treatment? No  Patient referred to the following for substance/alcohol abuse treatment with an appointment? Patient refused  Patient was offered medication to assist with substance/alcohol abuse cessation at discharge? Patient refused      Patient discharged to: Home; Transition record discussed with patient/caregiver and provided this record in hard copy or electronically

## 2024-10-10 NOTE — DISCHARGE INSTRUCTIONS
Someone from [facility] will be calling you tomorrow to follow up on your care. If you don't hear from us, give us a call! [phone].    Keep all follow up appointments, take medications as ordered, utilize positive supports, abstain from use of alcohol and drugs. If symptoms return or you feel at risk to yourself or others, please call 911, return the nearest emergency room, or call your local crisis hotline:  [facility]: [phone]  [facility]: [phone]  [facility]: [phone]    Due to the Covid-19 Pandemic, [facility] is not currently available. For assistance with quitting smoking please go to https://smokefree.gov. A prescription for an FDA-approved tobacco cessation medication was offered at discharge and the patient refused.    You were offered a flu vaccine but declined    Due to the Covid-19 Pandemic, [facility] is not currently available. For assistance with quitting smoking please go to https://smokefree.gov. A prescription for an FDA-approved tobacco cessation medication was offered at discharge and the patient refused.    Someone from [facility] will be calling you tomorrow to follow up on your care. If you don't hear from us, give us a call! [phone].

## 2024-10-10 NOTE — PROGRESS NOTES
Pt given all discharge information and instructions and able to verbalize an understanding of same. Pt signed all discharge documentation. Pt denies any suicidal or homicidal ideations or hallucinations. Pt ambulated off unit with staff member

## 2024-10-10 NOTE — DISCHARGE INSTR - DIET

## 2024-10-10 NOTE — GROUP NOTE
Group Therapy Note    Date: 10/9/2024    Group Start Time: 2000  Group End Time: 2045  Group Topic: Wrap-Up    [facility] 3W Simeon Juarez        Group Therapy Note    Attendees: 8/18     Notes:  Pt did not attend.     Discipline Responsible: Behavorial Health Tech      Signature:  Simeon Morales

## 2024-10-10 NOTE — DISCHARGE SUMMARY
DISCHARGE SUMMARY      Patient ID:  Dinesh Shen  [id number]  54 y.o.  1970      Admit date: 10/8/2024    Discharge date and time: 10/10/2024    Admitting Physician: Uli Albarran MD     Discharge Physician: Dr Andreas SARAH    Admission Diagnoses: Depression, unspecified depression type [F32.A]  Depression, unspecified [F32.A]    Admission Condition: poor    Discharged Condition: stable    Admission Circumstance:     54M patient brought to ER by [place] PD this evening after making a statement to his wife and son that he was considering suicide. Patient reports that he and his wife have been having problems and she had been seeing other people. Patient was under the impression that they [patient and his wife] were working on their marriage but wife told him tonight she remains in contact with the other man/men. Patient was upset, felt betrayed and lied to and admits to having a thought of suicide briefly in the midst of the discussion. Patient adamantly denies SI now, stating he has too much to live for, including his wife.     Patient has no psychiatric history and takes no medications. He has no hallucinations, no delusions. He does not want to be admitted tonight, prefers to finish out his work week and come back voluntarily at the end of the week for admission. He does state he wants treatment, but does not want to miss work.     HISTORY OF PRESENT ILLNESS:       The patient is a 54 y.o. male  living with wife, 16 and 24 y/o live with them, work 2 jobs with o significant past history of depression     Last night informed the children that they were   Family was planing to get himself admitted on Friday planned admit, planing to leave the house  Pt and wife was talking with his wife, found that she was talking with other men on the phone  Pt became emotional about his wife being dishonest with him. Pt wanted to leave the house the same night. Pt  informed wife that \"it was killing me\"  meaning that what she was doing to him was killing him   showed up and brought him to the hospital.     Been  for 36 years. 3 weeks ago pt felt that she was talking to some one else, but did not have proof. Wife work for [employer] as [employer] technicican. He found out about the relationship when he send a bouquet to the work place. Later on he confronted about it, talked about it and she agreed to erase the phone numbers, agree to be honest with each other  Last night when he found that she was talking with other men actively even after the talk, he decided to end the marriage  Pt denies feeling suicidal or ever had any plans  Has been feeling depressed over the past few weeks  Decrease sleep but no change in appetite  Feeling helpless and denies hopeless or worthless feeling  Want to live for his children and grandchildren  I don't want to go to [facility], its a major sin, my Sikhism belief would not permit   Pt go to [employer] regularly     The patient is not currently receiving care for the above psychiatric illness.     Medications Prior to Admission:     Prescriptions Prior to Admission   Medications Prior to Admission: terbinafine (LAMISIL) 1 % cream, Apply topically 2 times daily. (Patient not taking: Reported on 1/29/2021)  mupirocin (BACTROBAN) 2 % ointment, Apply topically 2 times daily. (Patient not taking: Reported on 1/29/2021)        Compliance:n/a     Psychiatric Review of Systems       Depression: yes     Mamta or Hypomania:  no     Panic Attacks:  no     Phobias:  no     Obsessions and Compulsions:  no     PTSD : no     Hallucinations:  no     Delusions:  no     Substance Abuse History:  ETOH: no   Marijuana: no  Opiates: no  Other Drugs: no        Past Psychiatric History:  Prior Diagnosis:  No past illness  Psychiatrist: no  Therapist:no  Hospitalization: no  Hx of Suicidal Attempts: no  Hx of violence:  no  ECT: no      PAST MEDICAL/PSYCHIATRIC HISTORY:   Past Medical History:   Diagnosis Date

## 2024-10-10 NOTE — PROGRESS NOTES
CLINICAL PHARMACY NOTE: MEDS TO BEDS    Total # of Prescriptions Filled: 1   The following medications were delivered to the patient:  Sertraline 25 mg Tab  Transferred to [facility] in [place]  Additional Documentation:

## 2024-10-10 NOTE — GROUP NOTE
Patient did not attend group / Anticipated discharge today.     Date: 10/10/2024    Group Start Time: 0934  Group End Time: 1029  Group Topic: Music Therapy    ML 3W Ama Jtet    Challenging Negative Self-Talk through Music  Live/Receptive Music Listening, Composition, Songwriting, Josette Analysis/Discussion    Patients will listen to \"Hand in My Pocket\" by Dennise Smith and discuss lyrics, themes, and/or interpretations derived from the song. Patients will identify positive/negative qualities about themselves to contribute to a group josette substitution. Patients will be given the option to add instruments to their recreation/performance with [facility] teaching patients instruments as needed, and perform the new song. Patients will discuss the experience as a group.     Focus: Building Positive Experiences, Challenging Negative Self-Talk    Goals: Improve Self-Expression, Improve Self-Esteem, Improve Mood, Increase Socialization, Develop Coping Skills, Decrease Negative Self-Talk, Improve Self-Awareness/Insight, Increase Sensory Stimulation, Improve Attention to Task     Documentation completed by CHRISTIANA Ash

## 2024-10-10 NOTE — CARE COORDINATION
FAMILY COLLATERAL NOTE    Family/Support Name: Glenny  Contact #:[phone]   Relationship to Pt:: Spouse    Family/Support contact aware of hospitalization: Yes    Presenting Symptoms/Current Concerns:  Severe depressions    Family Mental Health/Substance Use History:   None    Discharge Plan:   Return home with spouse/ [facility]    Support Network Supportive of Discharge Plan:   Yes    Support can confirm Safety of Location and Security of Weapons:   Yes    Support agreeable to Safeguard and Monitor Medications (including Prescription and OTC):   Yes    Identified Barriers to Compliance with Discharge Plan:   Yes    Recommendations for Support Network:   Available for follow up calls.      MARIA M Bobo

## 2024-10-11 ENCOUNTER — CARE COORDINATION (OUTPATIENT)
Dept: OTHER | Facility: CLINIC | Age: 54
End: 2024-10-11

## 2024-10-11 NOTE — CARE COORDINATION
Care Transitions Note    Initial Call - Call within 2 business days of discharge: Yes    Attempted to reach patient for transitions of care follow up. Unable to reach patient.    Outreach Attempts:   HIPAA compliant voicemail left for patient.     Patient: Dinesh Shen    Patient : 1970   MRN: [id number]    Reason for Admission: Depression  Discharge Date: 10/10/24  RURS: Readmission Risk Score: 2.6    Last Discharge Facility       Date Complaint Diagnosis Description Type Department Provider    10/8/24 Psychiatric Evaluation Depression, unspecified depression type ED to Hosp-Admission (Discharged) (ADMIT) Hermes Ramirez MD; David Ferguson...            Was this an external facility discharge? No    Follow Up Appointment:   Patient has hospital follow up appointment scheduled within 7 days of discharge.    Future Appointments         Provider Specialty Dept Phone    10/24/2024 10:30 AM Robbie Castillo MD Family Medicine [phone]            Plan for follow-up call in 2-5 days     JESSE ROBBINS RN

## 2024-10-14 ENCOUNTER — CARE COORDINATION (OUTPATIENT)
Dept: OTHER | Facility: CLINIC | Age: 54
End: 2024-10-14

## 2024-10-14 NOTE — CARE COORDINATION
Care Transitions Note    Initial Call - Call within 2 business days of discharge: Yes    Attempted to reach patient for transitions of care follow up. Unable to reach patient. Will send a UTR letter.     Outreach Attempts:   HIPAA compliant voicemail left for patient.     Patient: Dinesh Shen    Patient : 1970   MRN: [id number]    Reason for Admission: Depression  Discharge Date: 10/10/24  RURS: Readmission Risk Score: 2.6    Last Discharge Facility       Date Complaint Diagnosis Description Type Department Provider    10/8/24 Psychiatric Evaluation Depression, unspecified depression type ED to Hosp-Admission (Discharged) (ADMIT) Hermes Ramirez MD; David Ferguson...            Was this an external facility discharge? No    Follow Up Appointment:   Patient has hospital follow up appointment scheduled within 7 days of discharge.    Future Appointments         Provider Specialty Dept Phone    10/24/2024 10:30 AM Robbie Castillo MD Family Medicine [phone]            Plan for follow-up call in 11-14 days     JESSE ROBBINS RN

## 2024-10-24 ENCOUNTER — OFFICE VISIT (OUTPATIENT)
Dept: FAMILY MEDICINE CLINIC | Age: 54
End: 2024-10-24
Payer: COMMERCIAL

## 2024-10-24 VITALS
DIASTOLIC BLOOD PRESSURE: 80 MMHG | BODY MASS INDEX: 26.96 KG/M2 | HEIGHT: 69 IN | WEIGHT: 182 LBS | SYSTOLIC BLOOD PRESSURE: 138 MMHG | OXYGEN SATURATION: 98 % | HEART RATE: 82 BPM

## 2024-10-24 DIAGNOSIS — F51.02 ADJUSTMENT INSOMNIA: Primary | ICD-10-CM

## 2024-10-24 DIAGNOSIS — D18.1 LYMPHANGIOMA CIRCUMSCRIPTUM: ICD-10-CM

## 2024-10-24 DIAGNOSIS — Z12.11 COLON CANCER SCREENING: ICD-10-CM

## 2024-10-24 DIAGNOSIS — R63.4 WEIGHT LOSS: ICD-10-CM

## 2024-10-24 DIAGNOSIS — F32.9 REACTIVE DEPRESSION: ICD-10-CM

## 2024-10-24 DIAGNOSIS — L82.1 SEBORRHEIC KERATOSES: ICD-10-CM

## 2024-10-24 PROBLEM — I78.1 CAPILLARY ANGIOMA: Status: ACTIVE | Noted: 2024-10-24

## 2024-10-24 PROCEDURE — 99215 OFFICE O/P EST HI 40 MIN: CPT | Performed by: FAMILY MEDICINE

## 2024-10-24 RX ORDER — TRAZODONE HYDROCHLORIDE 50 MG/1
TABLET, FILM COATED ORAL
Qty: 90 TABLET | Refills: 1 | Status: SHIPPED | OUTPATIENT
Start: 2024-10-24

## 2024-10-24 SDOH — HEALTH STABILITY: PHYSICAL HEALTH: ON AVERAGE, HOW MANY DAYS PER WEEK DO YOU ENGAGE IN MODERATE TO STRENUOUS EXERCISE (LIKE A BRISK WALK)?: 7 DAYS

## 2024-10-24 SDOH — ECONOMIC STABILITY: INCOME INSECURITY: HOW HARD IS IT FOR YOU TO PAY FOR THE VERY BASICS LIKE FOOD, HOUSING, MEDICAL CARE, AND HEATING?: NOT HARD AT ALL

## 2024-10-24 SDOH — ECONOMIC STABILITY: FOOD INSECURITY: WITHIN THE PAST 12 MONTHS, THE FOOD YOU BOUGHT JUST DIDN'T LAST AND YOU DIDN'T HAVE MONEY TO GET MORE.: NEVER TRUE

## 2024-10-24 SDOH — ECONOMIC STABILITY: FOOD INSECURITY: WITHIN THE PAST 12 MONTHS, YOU WORRIED THAT YOUR FOOD WOULD RUN OUT BEFORE YOU GOT MONEY TO BUY MORE.: NEVER TRUE

## 2024-10-24 SDOH — HEALTH STABILITY: PHYSICAL HEALTH: ON AVERAGE, HOW MANY MINUTES DO YOU ENGAGE IN EXERCISE AT THIS LEVEL?: 90 MIN

## 2024-10-24 ASSESSMENT — ENCOUNTER SYMPTOMS
COLOR CHANGE: 1
PHOTOPHOBIA: 0
ABDOMINAL DISTENTION: 0
ABDOMINAL PAIN: 0
CHEST TIGHTNESS: 0
SHORTNESS OF BREATH: 0

## 2024-10-24 NOTE — PATIENT INSTRUCTIONS
Patient educated on the benign nature of his skin lesions.    Discussed weight loss in current scenario.  Likely secondary to decreased intake of cannabis along with stressors.  Lab work from hospitalization was normal no obvious metabolic cause.  Colonoscopy has not been done for screening for colon cancer so that will be ordered.    Insomnia will be addressed with trazodone.  Continue with psychiatry for Zoloft dose adjustments

## 2024-10-24 NOTE — PROGRESS NOTES
visit was spent coordinating current care, obtaining information for prior records, and counseling for current plan of action.  Review of all hospitalization testing         Assessment:       Diagnosis Orders   1. Adjustment insomnia  traZODone (DESYREL) 50 MG tablet      2. Reactive depression        3. Colon cancer screening  Ambulatory referral to Colorectal Surgery      4. Weight loss        5. Seborrheic keratoses        6. Lymphangioma circumscriptum              Orders Placed This Encounter   Procedures    Ambulatory referral to Colorectal Surgery     Referral Priority:   Routine     Referral Type:   Surgical     Referral Reason:   Other     Referred to Provider:   Aamir Benites MD     Number of Visits Requested:   1       Orders Placed This Encounter   Medications    traZODone (DESYREL) 50 MG tablet     Si/2-3 tab by mouth nightly as needed for sleep     Dispense:  90 tablet     Refill:  1          Medication List            Accurate as of 2024 11:06 AM. If you have any questions, ask your nurse or doctor.                START taking these medications      traZODone 50 MG tablet  Commonly known as: DESYREL  1/2-3 tab by mouth nightly as needed for sleep  Started by: Dr. Robbie Castillo MD            CONTINUE taking these medications      sertraline 25 MG tablet  Commonly known as: ZOLOFT  Take 1 tablet by mouth daily               Where to Get Your Medications        These medications were sent to [id number] - [place], [place] - [address] - P [phone] - F [phone]  [address] [place] [address] [facility] [address]      Phone: [phone]   traZODone 50 MG tablet           Plan:   Return in about 4 weeks (around 2024) for for review of outcome of today's recommendation.    Patient Instructions   Patient educated on the benign nature of his skin lesions.    Discussed weight loss in current scenario.  Likely secondary to decreased intake of cannabis along with

## 2024-10-30 ENCOUNTER — CARE COORDINATION (OUTPATIENT)
Dept: OTHER | Facility: CLINIC | Age: 54
End: 2024-10-30

## 2024-10-30 NOTE — CARE COORDINATION
Ambulatory Care Coordination Note     10/30/2024 2:43 PM     patient outreach attempt by this ACM today to offer care management services. ACM was unable to reach the patient by telephone today; left voice message requesting a return phone call to this ACM.     Patient closed (unable to reach patient) from the High Risk Care Management program on 10/30/2024. No further Ambulatory Care Manager follow up scheduled.

## 2024-10-31 ENCOUNTER — PREP FOR PROCEDURE (OUTPATIENT)
Dept: SURGERY | Age: 54
End: 2024-10-31

## 2024-10-31 DIAGNOSIS — Z12.11 ENCOUNTER FOR SCREENING COLONOSCOPY: ICD-10-CM

## 2024-10-31 RX ORDER — SODIUM CHLORIDE 0.9 % (FLUSH) 0.9 %
5-40 SYRINGE (ML) INJECTION EVERY 12 HOURS SCHEDULED
Status: CANCELLED | OUTPATIENT
Start: 2024-10-31

## 2024-10-31 RX ORDER — SODIUM CHLORIDE 9 MG/ML
INJECTION, SOLUTION INTRAVENOUS PRN
Status: CANCELLED | OUTPATIENT
Start: 2024-10-31

## 2024-10-31 RX ORDER — SODIUM CHLORIDE 0.9 % (FLUSH) 0.9 %
5-40 SYRINGE (ML) INJECTION PRN
Status: CANCELLED | OUTPATIENT
Start: 2024-10-31

## 2024-11-30 PROBLEM — Z12.11 ENCOUNTER FOR SCREENING COLONOSCOPY: Status: RESOLVED | Noted: 2024-10-31 | Resolved: 2024-11-30

## 2024-12-03 PROBLEM — Z12.11 ENCOUNTER FOR SCREENING COLONOSCOPY: Status: ACTIVE | Noted: 2024-10-31

## 2024-12-07 ENCOUNTER — ANESTHESIA EVENT (OUTPATIENT)
Dept: OPERATING ROOM | Age: 54
End: 2024-12-07
Payer: COMMERCIAL

## 2024-12-09 ENCOUNTER — HOSPITAL ENCOUNTER (OUTPATIENT)
Age: 54
Setting detail: OUTPATIENT SURGERY
Discharge: HOME | End: 2024-12-09
Attending: COLON & RECTAL SURGERY | Admitting: COLON & RECTAL SURGERY
Payer: COMMERCIAL

## 2024-12-09 ENCOUNTER — ANESTHESIA (OUTPATIENT)
Dept: OPERATING ROOM | Age: 54
End: 2024-12-09
Payer: COMMERCIAL

## 2024-12-09 VITALS
TEMPERATURE: 98.1 F | RESPIRATION RATE: 16 BRPM | SYSTOLIC BLOOD PRESSURE: 122 MMHG | OXYGEN SATURATION: 97 % | HEART RATE: 57 BPM | BODY MASS INDEX: 24.88 KG/M2 | HEIGHT: 69 IN | WEIGHT: 168 LBS | DIASTOLIC BLOOD PRESSURE: 84 MMHG

## 2024-12-09 PROCEDURE — 2580000003 HC RX 258

## 2024-12-09 PROCEDURE — 7100000001 HC PACU RECOVERY - ADDTL 15 MIN: Performed by: COLON & RECTAL SURGERY

## 2024-12-09 PROCEDURE — 45378 DIAGNOSTIC COLONOSCOPY: CPT | Performed by: COLON & RECTAL SURGERY

## 2024-12-09 PROCEDURE — 3700000001 HC ADD 15 MINUTES (ANESTHESIA): Performed by: COLON & RECTAL SURGERY

## 2024-12-09 PROCEDURE — 3609027000 HC COLONOSCOPY: Performed by: COLON & RECTAL SURGERY

## 2024-12-09 PROCEDURE — 3700000000 HC ANESTHESIA ATTENDED CARE: Performed by: COLON & RECTAL SURGERY

## 2024-12-09 PROCEDURE — 2580000003 HC RX 258: Performed by: COLON & RECTAL SURGERY

## 2024-12-09 PROCEDURE — 6360000002 HC RX W HCPCS

## 2024-12-09 PROCEDURE — 7100000010 HC PHASE II RECOVERY - FIRST 15 MIN: Performed by: COLON & RECTAL SURGERY

## 2024-12-09 PROCEDURE — 2709999900 HC NON-CHARGEABLE SUPPLY: Performed by: COLON & RECTAL SURGERY

## 2024-12-09 PROCEDURE — 7100000000 HC PACU RECOVERY - FIRST 15 MIN: Performed by: COLON & RECTAL SURGERY

## 2024-12-09 RX ORDER — HYDRALAZINE HYDROCHLORIDE 20 MG/ML
10 INJECTION INTRAMUSCULAR; INTRAVENOUS
Status: DISCONTINUED | OUTPATIENT
Start: 2024-12-09 | End: 2024-12-09 | Stop reason: HOSPADM

## 2024-12-09 RX ORDER — SODIUM CHLORIDE 0.9 % (FLUSH) 0.9 %
5-40 SYRINGE (ML) INJECTION PRN
Status: DISCONTINUED | OUTPATIENT
Start: 2024-12-09 | End: 2024-12-09 | Stop reason: HOSPADM

## 2024-12-09 RX ORDER — METOCLOPRAMIDE HYDROCHLORIDE 5 MG/ML
10 INJECTION INTRAMUSCULAR; INTRAVENOUS
Status: DISCONTINUED | OUTPATIENT
Start: 2024-12-09 | End: 2024-12-09 | Stop reason: HOSPADM

## 2024-12-09 RX ORDER — IPRATROPIUM BROMIDE AND ALBUTEROL SULFATE 2.5; .5 MG/3ML; MG/3ML
1 SOLUTION RESPIRATORY (INHALATION)
Status: DISCONTINUED | OUTPATIENT
Start: 2024-12-09 | End: 2024-12-09 | Stop reason: HOSPADM

## 2024-12-09 RX ORDER — ONDANSETRON 2 MG/ML
4 INJECTION INTRAMUSCULAR; INTRAVENOUS
Status: DISCONTINUED | OUTPATIENT
Start: 2024-12-09 | End: 2024-12-09 | Stop reason: HOSPADM

## 2024-12-09 RX ORDER — SODIUM CHLORIDE 9 MG/ML
INJECTION, SOLUTION INTRAVENOUS
Status: COMPLETED
Start: 2024-12-09 | End: 2024-12-09

## 2024-12-09 RX ORDER — SODIUM CHLORIDE 9 MG/ML
INJECTION, SOLUTION INTRAVENOUS PRN
Status: DISCONTINUED | OUTPATIENT
Start: 2024-12-09 | End: 2024-12-09 | Stop reason: HOSPADM

## 2024-12-09 RX ORDER — NALOXONE HYDROCHLORIDE 0.4 MG/ML
INJECTION, SOLUTION INTRAMUSCULAR; INTRAVENOUS; SUBCUTANEOUS PRN
Status: DISCONTINUED | OUTPATIENT
Start: 2024-12-09 | End: 2024-12-09 | Stop reason: HOSPADM

## 2024-12-09 RX ORDER — DEXTROSE MONOHYDRATE 100 MG/ML
INJECTION, SOLUTION INTRAVENOUS CONTINUOUS PRN
Status: DISCONTINUED | OUTPATIENT
Start: 2024-12-09 | End: 2024-12-09 | Stop reason: HOSPADM

## 2024-12-09 RX ORDER — SODIUM CHLORIDE 0.9 % (FLUSH) 0.9 %
5-40 SYRINGE (ML) INJECTION EVERY 12 HOURS SCHEDULED
Status: DISCONTINUED | OUTPATIENT
Start: 2024-12-09 | End: 2024-12-09 | Stop reason: HOSPADM

## 2024-12-09 RX ORDER — GLUCAGON 1 MG/ML
1 KIT INJECTION PRN
Status: DISCONTINUED | OUTPATIENT
Start: 2024-12-09 | End: 2024-12-09 | Stop reason: HOSPADM

## 2024-12-09 RX ORDER — LABETALOL HYDROCHLORIDE 5 MG/ML
10 INJECTION, SOLUTION INTRAVENOUS
Status: DISCONTINUED | OUTPATIENT
Start: 2024-12-09 | End: 2024-12-09 | Stop reason: HOSPADM

## 2024-12-09 RX ORDER — PROPOFOL 10 MG/ML
INJECTION, EMULSION INTRAVENOUS
Status: DISCONTINUED | OUTPATIENT
Start: 2024-12-09 | End: 2024-12-09 | Stop reason: SDUPTHER

## 2024-12-09 RX ORDER — LIDOCAINE HYDROCHLORIDE 20 MG/ML
INJECTION, SOLUTION INTRAVENOUS
Status: DISCONTINUED | OUTPATIENT
Start: 2024-12-09 | End: 2024-12-09 | Stop reason: SDUPTHER

## 2024-12-09 RX ADMIN — PROPOFOL 40 MG: 10 INJECTION, EMULSION INTRAVENOUS at 08:50

## 2024-12-09 RX ADMIN — SODIUM CHLORIDE 500 ML: 9 INJECTION, SOLUTION INTRAVENOUS at 07:05

## 2024-12-09 RX ADMIN — PROPOFOL 20 MG: 10 INJECTION, EMULSION INTRAVENOUS at 08:52

## 2024-12-09 RX ADMIN — LIDOCAINE HYDROCHLORIDE 20 MG: 20 INJECTION, SOLUTION INTRAVENOUS at 08:41

## 2024-12-09 RX ADMIN — PROPOFOL 100 MG: 10 INJECTION, EMULSION INTRAVENOUS at 08:41

## 2024-12-09 RX ADMIN — PROPOFOL 100 MCG/KG/MIN: 10 INJECTION, EMULSION INTRAVENOUS at 08:45

## 2024-12-09 ASSESSMENT — PAIN - FUNCTIONAL ASSESSMENT: PAIN_FUNCTIONAL_ASSESSMENT: 0-10

## 2024-12-09 ASSESSMENT — PAIN SCALES - GENERAL: PAINLEVEL_OUTOF10: 0

## 2024-12-09 NOTE — ANESTHESIA PRE PROCEDURE
Department of Anesthesiology  Preprocedure Note       Name:  Dinesh Shen   Age:  54 y.o.  :  1970                                          MRN:  [id number]         Date:  2024      Surgeon: Surgeon(s):  Aamir Benites MD    Procedure: Procedure(s):  colonoscopy    Medications prior to admission:   Prior to Admission medications    Medication Sig Start Date End Date Taking? Authorizing Provider   traZODone (DESYREL) 50 MG tablet 1/2-3 tab by mouth nightly as needed for sleep 10/24/24  Yes Robbie Castillo MD   sertraline (ZOLOFT) 25 MG tablet Take 1 tablet by mouth daily 10/10/24  Yes Hermes Johns MD       Current medications:    Current Facility-Administered Medications   Medication Dose Route Frequency Provider Last Rate Last Admin   • naloxone 0.4 mg in 10 mL sodium chloride syringe   IntraVENous PRN Zack Bang MD       • sodium chloride flush 0.9 % injection 5-40 mL  5-40 mL IntraVENous 2 times per day Zack Bang MD       • sodium chloride flush 0.9 % injection 5-40 mL  5-40 mL IntraVENous PRN Zack Bang MD       • 0.9 % sodium chloride infusion   IntraVENous PRN Zack Bang MD       • ondansetron (ZOFRAN) injection 4 mg  4 mg IntraVENous Once PRN Zack Bang MD       • metoclopramide (REGLAN) injection 10 mg  10 mg IntraVENous Once PRN Zack Bang MD       • labetalol (NORMODYNE;TRANDATE) injection 10 mg  10 mg IntraVENous Q15 Min PRN Zack Bang MD        Or   • hydrALAZINE (APRESOLINE) injection 10 mg  10 mg IntraVENous Q15 Min PRN Zack Bang MD       • ipratropium 0.5 mg-albuterol 2.5 mg (DUONEB) nebulizer solution 1 Dose  1 Dose Inhalation Once PRN Zack Bang MD       • glucose chewable tablet 16 g  4 tablet Oral PRN Zack Bang MD       • dextrose bolus 10% 125 mL  125 mL IntraVENous PRN Zack Bang MD        Or   • dextrose bolus 10% 250 mL  250 mL IntraVENous PRN Zack Bang MD       • glucagon

## 2024-12-09 NOTE — ANESTHESIA POSTPROCEDURE EVALUATION
Department of Anesthesiology  Postprocedure Note    Patient: Dinesh Shen  MRN: [id number]  YOB: 1970  Date of evaluation: 12/9/2024    Procedure Summary       Date: 12/09/24 Room / Location: [address]    Anesthesia Start: 0839 Anesthesia Stop: 0859    Procedure: colonoscopy Diagnosis:       Encounter for screening colonoscopy      (Encounter for screening colonoscopy [Z12.11])    Surgeons: Aamir Benites MD Responsible Provider: Zack Bang MD    Anesthesia Type: Not recorded ASA Status: Not recorded            Anesthesia Type: No value filed.    Kaushal Phase I: Kaushal Score: 10    Kaushal Phase II:      Anesthesia Post Evaluation    Patient location during evaluation: bedside  Patient participation: complete - patient participated  Level of consciousness: awake and awake and alert  Pain score: 0  Airway patency: patent  Nausea & Vomiting: no nausea and no vomiting  Cardiovascular status: blood pressure returned to baseline and hemodynamically stable  Respiratory status: acceptable and room air  Hydration status: euvolemic  Pain management: adequate        No notable events documented.

## 2024-12-09 NOTE — DISCHARGE INSTRUCTIONS
Normal post colonoscopy instructions    Repeat colonoscopy in 10 years    No driving today      Discharge Instructions for Colonoscopy     Colonoscopy is a visual exam of the lining of the large intestine, also called the bowel or colon, with a colonoscope. A colonoscope is a flexible tube with a light and a viewing device. It allows the doctor to view the inside of the colon through a tiny video camera.    Colonoscopy is performed for many reasons: unexplained anemia , pain, diarrhea , bloody stools, cancer screening, among many other reasons.    Complications from a colonoscopy are rare. Some possible serious complications include perforated bowel (which might require surgery) and bleeding (which could require blood transfusion ). Minor complications include bloating, gas, and cramping that can last for 1-2 days after the procedure.    Because air is put into your colon during the procedure, it is normal to pass large amounts of air from your rectum. You may not have a bowel movement for 1-3 days after the procedure.    What You Will Need    Someone to drive you home after the procedure   Steps to Take   Home Care     Rest when you get home.     Because the sedative will make you drowsy, don't drive, operate machinery, or make important decisions the day of the procedure.     Feelings of bloating, gas, or cramping may persist for 24 hours.   Diet     Try sips of water first. If tolerated, resume regular diet or the diet recommended by your physician.     Do not drink alcohol for 24 hours.   Physical Activity     Ask your doctor when you will be able to return to work.     Do not drive, operate heavy machinery, or do activities that require coordination or balance for 24 hours.     Otherwise, return to your normal routine as soon as you are comfortable to do so, which is usually the next day after the procedure.   Medications    When taking medications, it's important to:    Take your medication as directednot

## 2024-12-09 NOTE — H&P
Department of General Surgery - Adult  Surgical Service colorectal surgery  Attending admit note        CHIEF COMPLAINT: Colon screening    History Obtained From:  patient, electronic medical record    HISTORY OF PRESENT ILLNESS:                The patient is a 54 y.o. male who presents with desire for colon screening.  Denies rectal bleeding, abdominal pain or any unintentional weight loss    First colonoscopy.  Bowel prep went well.  Risks and benefits discussed regarding colonoscopy.  He understands and wishes to proceed.  Consent obtained.    Past Medical History:        Diagnosis Date    Depression, unspecified 10/9/2024    Kidney stones     kidney stones     Past Surgical History:        Procedure Laterality Date    CYST REMOVAL  1995    on spine    CYSTOSCOPY  08/19/15    FLEX W/STENT EXTRACT     Current Medications:   Current Facility-Administered Medications: sodium chloride flush 0.9 % injection 5-40 mL, 5-40 mL, IntraVENous, 2 times per day  sodium chloride flush 0.9 % injection 5-40 mL, 5-40 mL, IntraVENous, PRN  0.9 % sodium chloride infusion, , IntraVENous, PRN  Allergies:  Patient has no known allergies.    Social History:   TOBACCO:   reports that he has never smoked. He has never been exposed to tobacco smoke. He has never used smokeless tobacco.  ETOH:   reports current alcohol use of about 7.0 standard drinks of alcohol per week.  DRUGS:   reports current drug use. Drug: Marijuana (Weed).  Family History:       Problem Relation Age of Onset    Cancer Mother     Heart Disease Father     No Known Problems Sister     No Known Problems Brother     No Known Problems Maternal Aunt     No Known Problems Maternal Uncle     No Known Problems Paternal Aunt     No Known Problems Paternal Uncle     No Known Problems Maternal Grandmother     No Known Problems Maternal Grandfather     No Known Problems Paternal Grandmother     No Known Problems Paternal Grandfather     No Known Problems Maternal Cousin     No

## (undated) DEVICE — JELLY,LUBE,STERILE,FLIP TOP,TUBE,2-OZ: Brand: MEDLINE

## (undated) DEVICE — BRUSH ENDO CLN L90.5IN SHTH DIA1.7MM BRIST DIA5-7MM 2-6MM

## (undated) DEVICE — SPONGE GZ W4XL4IN RAYON POLY CVR W/NONWOVEN FAB STRL 2/PK

## (undated) DEVICE — TUBING IRRIGATION 140/160/180/190 SER GI ENDOSCP SMARTCAP

## (undated) DEVICE — ENDO CARRY-ON PROCEDURE KIT INCLUDES LUBRICANT, DEFENDO OLYMPUS AIR, WATER, SUCTION, BIOPSY VALVE KIT, ENZYMATIC SPONGE, AND BASIN.: Brand: ENDO CARRY-ON PROCEDURE KIT

## (undated) DEVICE — GLOVE ORANGE PI 7 1/2   MSG9075

## (undated) DEVICE — TUBE SET 96 MM 64 MM H2O PERISTALTIC STD AUX CHANNEL

## (undated) DEVICE — SINGLE PORT MANIFOLD: Brand: NEPTUNE 2

## (undated) DEVICE — ADAPTER FLSH PMP FLD MGMT GI IRRIG OFP 2 DISPOSABLE